# Patient Record
Sex: FEMALE | Race: WHITE | NOT HISPANIC OR LATINO | URBAN - METROPOLITAN AREA
[De-identification: names, ages, dates, MRNs, and addresses within clinical notes are randomized per-mention and may not be internally consistent; named-entity substitution may affect disease eponyms.]

---

## 2021-11-09 ENCOUNTER — PREPPED CHART (OUTPATIENT)
Dept: URBAN - METROPOLITAN AREA CLINIC 21 | Facility: CLINIC | Age: 56
End: 2021-11-09

## 2022-04-13 ENCOUNTER — ESTABLISHED PATIENT (OUTPATIENT)
Dept: URBAN - METROPOLITAN AREA CLINIC 21 | Facility: CLINIC | Age: 57
End: 2022-04-13

## 2022-04-13 DIAGNOSIS — H40.243: ICD-10-CM

## 2022-04-13 PROCEDURE — 92083 EXTENDED VISUAL FIELD XM: CPT

## 2022-04-13 ASSESSMENT — TONOMETRY
OS_IOP_MMHG: 10
OD_IOP_MMHG: 10

## 2022-05-04 ASSESSMENT — VISUAL ACUITY
OU_CC: J2
OS_CC: 20/30
OU_CC: 20/25
OD_CC: 20/40

## 2022-05-04 ASSESSMENT — TONOMETRY
OS_IOP_MMHG: 10
OD_IOP_MMHG: 9

## 2022-05-10 ENCOUNTER — ESTABLISHED PATIENT (OUTPATIENT)
Dept: URBAN - METROPOLITAN AREA CLINIC 21 | Facility: CLINIC | Age: 57
End: 2022-05-10

## 2022-05-10 DIAGNOSIS — H40.243: ICD-10-CM

## 2022-05-10 DIAGNOSIS — H35.363: ICD-10-CM

## 2022-05-10 PROCEDURE — 92083 EXTENDED VISUAL FIELD XM: CPT

## 2022-05-10 PROCEDURE — 92014 COMPRE OPH EXAM EST PT 1/>: CPT

## 2022-05-10 ASSESSMENT — TONOMETRY
OD_IOP_MMHG: 13
OS_IOP_MMHG: 12

## 2022-05-10 ASSESSMENT — VISUAL ACUITY
OD_SC: 20/30-2
OS_SC: 20/40

## 2022-05-10 NOTE — PATIENT DISCUSSION
The IOP is in the target range. The patient will continue the current management of Brimonidine BID OU.

## 2023-05-16 ENCOUNTER — ESTABLISHED PATIENT (OUTPATIENT)
Dept: URBAN - METROPOLITAN AREA CLINIC 21 | Facility: CLINIC | Age: 58
End: 2023-05-16

## 2023-05-16 DIAGNOSIS — H40.243: ICD-10-CM

## 2023-05-16 PROCEDURE — 92133 CPTRZD OPH DX IMG PST SGM ON: CPT

## 2023-05-16 PROCEDURE — 92012 INTRM OPH EXAM EST PATIENT: CPT

## 2023-05-16 ASSESSMENT — VISUAL ACUITY
OD_SC: 20/25-2
OU_CC: J1+
OU_SC: 20/25-2
OS_SC: 20/30

## 2023-05-16 ASSESSMENT — TONOMETRY
OS_IOP_MMHG: 11
OD_IOP_MMHG: 13

## 2024-03-05 ENCOUNTER — ESTABLISHED PATIENT (OUTPATIENT)
Dept: URBAN - METROPOLITAN AREA CLINIC 21 | Facility: CLINIC | Age: 59
End: 2024-03-05

## 2024-03-05 DIAGNOSIS — H40.243: ICD-10-CM

## 2024-03-05 PROCEDURE — 99214 OFFICE O/P EST MOD 30 MIN: CPT

## 2024-03-05 ASSESSMENT — VISUAL ACUITY
OD_SC: 20/30
OU_SC: 20/30
OS_SC: 20/40+2

## 2024-03-05 ASSESSMENT — TONOMETRY
OD_IOP_MMHG: 11
OS_IOP_MMHG: 14

## 2024-09-04 ENCOUNTER — TECH ONLY (OUTPATIENT)
Dept: URBAN - METROPOLITAN AREA CLINIC 21 | Facility: CLINIC | Age: 59
End: 2024-09-04

## 2024-09-04 DIAGNOSIS — H40.243: ICD-10-CM

## 2024-09-04 DIAGNOSIS — H35.363: ICD-10-CM

## 2024-09-04 PROCEDURE — 99211T TECH SERVICE

## 2024-09-04 PROCEDURE — 92083 EXTENDED VISUAL FIELD XM: CPT

## 2024-09-04 PROCEDURE — 92134 CPTRZ OPH DX IMG PST SGM RTA: CPT

## 2024-10-08 ENCOUNTER — FOLLOW UP (OUTPATIENT)
Dept: URBAN - METROPOLITAN AREA CLINIC 21 | Facility: CLINIC | Age: 59
End: 2024-10-08

## 2024-10-08 DIAGNOSIS — H25.813: ICD-10-CM

## 2024-10-08 DIAGNOSIS — H16.223: ICD-10-CM

## 2024-10-08 DIAGNOSIS — H40.243: ICD-10-CM

## 2024-10-08 PROCEDURE — 92012 INTRM OPH EXAM EST PATIENT: CPT
